# Patient Record
Sex: OTHER/UNKNOWN | URBAN - METROPOLITAN AREA
[De-identification: names, ages, dates, MRNs, and addresses within clinical notes are randomized per-mention and may not be internally consistent; named-entity substitution may affect disease eponyms.]

---

## 2023-08-24 ENCOUNTER — TELEPHONE (OUTPATIENT)
Dept: PLASTIC SURGERY | Facility: CLINIC | Age: 68
End: 2023-08-24

## 2023-08-24 NOTE — CONFIDENTIAL NOTE
Pt called to discuss vaginoplasty consult with Dr. Galvan. Writer discussed full and minimal depth vaginoplasty and hair removal requirement. Pt desires full depth. Pt stated she is doing hair removal on her face at [facility]. She asked about insurance coverage for hair removal - writer discussed letter of medical necessity, and other options for getting this covered.     Pt stated she is working on getting top surgery with another provider and doesn't want to overload her schedule right now. She will call back to schedule consult.